# Patient Record
Sex: MALE | HISPANIC OR LATINO | URBAN - METROPOLITAN AREA
[De-identification: names, ages, dates, MRNs, and addresses within clinical notes are randomized per-mention and may not be internally consistent; named-entity substitution may affect disease eponyms.]

---

## 2019-05-14 PROBLEM — Z00.00 ENCOUNTER FOR PREVENTIVE HEALTH EXAMINATION: Status: ACTIVE | Noted: 2019-05-14

## 2021-06-11 ENCOUNTER — EMERGENCY (EMERGENCY)
Facility: HOSPITAL | Age: 66
LOS: 1 days | Discharge: HOME | End: 2021-06-11
Attending: EMERGENCY MEDICINE
Payer: COMMERCIAL

## 2021-06-11 VITALS
WEIGHT: 250 LBS | SYSTOLIC BLOOD PRESSURE: 152 MMHG | OXYGEN SATURATION: 98 % | HEART RATE: 77 BPM | RESPIRATION RATE: 18 BRPM | HEIGHT: 72 IN | TEMPERATURE: 98 F | DIASTOLIC BLOOD PRESSURE: 84 MMHG

## 2021-06-11 LAB
ALBUMIN SERPL ELPH-MCNC: 4.2 G/DL — SIGNIFICANT CHANGE UP (ref 3.3–5.2)
ALP SERPL-CCNC: 96 U/L — SIGNIFICANT CHANGE UP (ref 40–120)
ALT FLD-CCNC: 17 U/L — SIGNIFICANT CHANGE UP
ANION GAP SERPL CALC-SCNC: 12 MMOL/L — SIGNIFICANT CHANGE UP (ref 5–17)
AST SERPL-CCNC: 20 U/L — SIGNIFICANT CHANGE UP
BASOPHILS # BLD AUTO: 0.24 K/UL — HIGH (ref 0–0.2)
BASOPHILS NFR BLD AUTO: 2.4 % — HIGH (ref 0–2)
BILIRUB SERPL-MCNC: 0.4 MG/DL — SIGNIFICANT CHANGE UP (ref 0.4–2)
BUN SERPL-MCNC: 14 MG/DL — SIGNIFICANT CHANGE UP (ref 8–20)
CALCIUM SERPL-MCNC: 9.1 MG/DL — SIGNIFICANT CHANGE UP (ref 8.6–10.2)
CHLORIDE SERPL-SCNC: 107 MMOL/L — SIGNIFICANT CHANGE UP (ref 98–107)
CO2 SERPL-SCNC: 23 MMOL/L — SIGNIFICANT CHANGE UP (ref 22–29)
CREAT SERPL-MCNC: 0.84 MG/DL — SIGNIFICANT CHANGE UP (ref 0.5–1.3)
EOSINOPHIL # BLD AUTO: 0.48 K/UL — SIGNIFICANT CHANGE UP (ref 0–0.5)
EOSINOPHIL NFR BLD AUTO: 4.9 % — SIGNIFICANT CHANGE UP (ref 0–6)
GLUCOSE SERPL-MCNC: 95 MG/DL — SIGNIFICANT CHANGE UP (ref 70–99)
HCT VFR BLD CALC: 41.4 % — SIGNIFICANT CHANGE UP (ref 39–50)
HGB BLD-MCNC: 13.7 G/DL — SIGNIFICANT CHANGE UP (ref 13–17)
IMM GRANULOCYTES NFR BLD AUTO: 0.3 % — SIGNIFICANT CHANGE UP (ref 0–1.5)
LACTATE BLDV-MCNC: 1.1 MMOL/L — SIGNIFICANT CHANGE UP (ref 0.5–2)
LYMPHOCYTES # BLD AUTO: 1.44 K/UL — SIGNIFICANT CHANGE UP (ref 1–3.3)
LYMPHOCYTES # BLD AUTO: 14.6 % — SIGNIFICANT CHANGE UP (ref 13–44)
MCHC RBC-ENTMCNC: 28.4 PG — SIGNIFICANT CHANGE UP (ref 27–34)
MCHC RBC-ENTMCNC: 33.1 GM/DL — SIGNIFICANT CHANGE UP (ref 32–36)
MCV RBC AUTO: 85.9 FL — SIGNIFICANT CHANGE UP (ref 80–100)
MONOCYTES # BLD AUTO: 0.72 K/UL — SIGNIFICANT CHANGE UP (ref 0–0.9)
MONOCYTES NFR BLD AUTO: 7.3 % — SIGNIFICANT CHANGE UP (ref 2–14)
NEUTROPHILS # BLD AUTO: 6.95 K/UL — SIGNIFICANT CHANGE UP (ref 1.8–7.4)
NEUTROPHILS NFR BLD AUTO: 70.5 % — SIGNIFICANT CHANGE UP (ref 43–77)
PLATELET # BLD AUTO: 178 K/UL — SIGNIFICANT CHANGE UP (ref 150–400)
POTASSIUM SERPL-MCNC: 4.3 MMOL/L — SIGNIFICANT CHANGE UP (ref 3.5–5.3)
POTASSIUM SERPL-SCNC: 4.3 MMOL/L — SIGNIFICANT CHANGE UP (ref 3.5–5.3)
PROT SERPL-MCNC: 7.5 G/DL — SIGNIFICANT CHANGE UP (ref 6.6–8.7)
RBC # BLD: 4.82 M/UL — SIGNIFICANT CHANGE UP (ref 4.2–5.8)
RBC # FLD: 13.5 % — SIGNIFICANT CHANGE UP (ref 10.3–14.5)
SODIUM SERPL-SCNC: 142 MMOL/L — SIGNIFICANT CHANGE UP (ref 135–145)
WBC # BLD: 9.86 K/UL — SIGNIFICANT CHANGE UP (ref 3.8–10.5)
WBC # FLD AUTO: 9.86 K/UL — SIGNIFICANT CHANGE UP (ref 3.8–10.5)

## 2021-06-11 PROCEDURE — 73140 X-RAY EXAM OF FINGER(S): CPT | Mod: 26,LT

## 2021-06-11 PROCEDURE — 99218: CPT

## 2021-06-11 RX ORDER — PIPERACILLIN AND TAZOBACTAM 4; .5 G/20ML; G/20ML
3.38 INJECTION, POWDER, LYOPHILIZED, FOR SOLUTION INTRAVENOUS EVERY 8 HOURS
Refills: 0 | Status: DISCONTINUED | OUTPATIENT
Start: 2021-06-12 | End: 2021-06-16

## 2021-06-11 RX ORDER — IBUPROFEN 200 MG
600 TABLET ORAL EVERY 6 HOURS
Refills: 0 | Status: DISCONTINUED | OUTPATIENT
Start: 2021-06-11 | End: 2021-06-16

## 2021-06-11 RX ORDER — PIPERACILLIN AND TAZOBACTAM 4; .5 G/20ML; G/20ML
3.38 INJECTION, POWDER, LYOPHILIZED, FOR SOLUTION INTRAVENOUS ONCE
Refills: 0 | Status: COMPLETED | OUTPATIENT
Start: 2021-06-11 | End: 2021-06-11

## 2021-06-11 RX ORDER — TETANUS TOXOID, REDUCED DIPHTHERIA TOXOID AND ACELLULAR PERTUSSIS VACCINE, ADSORBED 5; 2.5; 8; 8; 2.5 [IU]/.5ML; [IU]/.5ML; UG/.5ML; UG/.5ML; UG/.5ML
0.5 SUSPENSION INTRAMUSCULAR ONCE
Refills: 0 | Status: COMPLETED | OUTPATIENT
Start: 2021-06-11 | End: 2021-06-11

## 2021-06-11 RX ORDER — ACETAMINOPHEN 500 MG
650 TABLET ORAL ONCE
Refills: 0 | Status: COMPLETED | OUTPATIENT
Start: 2021-06-11 | End: 2021-06-11

## 2021-06-11 RX ORDER — ACETAMINOPHEN 500 MG
650 TABLET ORAL EVERY 6 HOURS
Refills: 0 | Status: DISCONTINUED | OUTPATIENT
Start: 2021-06-11 | End: 2021-06-16

## 2021-06-11 RX ADMIN — TETANUS TOXOID, REDUCED DIPHTHERIA TOXOID AND ACELLULAR PERTUSSIS VACCINE, ADSORBED 0.5 MILLILITER(S): 5; 2.5; 8; 8; 2.5 SUSPENSION INTRAMUSCULAR at 17:54

## 2021-06-11 RX ADMIN — PIPERACILLIN AND TAZOBACTAM 200 GRAM(S): 4; .5 INJECTION, POWDER, LYOPHILIZED, FOR SOLUTION INTRAVENOUS at 18:43

## 2021-06-11 RX ADMIN — Medication 650 MILLIGRAM(S): at 18:24

## 2021-06-11 RX ADMIN — Medication 650 MILLIGRAM(S): at 17:54

## 2021-06-11 RX ADMIN — Medication 600 MILLIGRAM(S): at 18:24

## 2021-06-11 RX ADMIN — PIPERACILLIN AND TAZOBACTAM 3.38 GRAM(S): 4; .5 INJECTION, POWDER, LYOPHILIZED, FOR SOLUTION INTRAVENOUS at 19:20

## 2021-06-11 RX ADMIN — Medication 600 MILLIGRAM(S): at 17:54

## 2021-06-11 NOTE — ED PROVIDER NOTE - PHYSICAL EXAMINATION
Gen: Well appearing in NAD  Head: NC/AT  Neck: trachea midline  Resp:  No distress  left 4th digit diffuse swelling + distal lateral and pulp fluctuance + erythema extending to the anterior distal wrist. compartments soft. able to flex and extend at the wrist. + flexion and extension of the mcp dip, difficulty flexing at pip. nttp over flexor tendon.   Neuro:  A&O appears non focal  Skin:  Warm and dry as visualized  Psych:  Normal affect and mood

## 2021-06-11 NOTE — CONSULT NOTE ADULT - ATTENDING COMMENTS
Patient seen and examined at bedside with MARINA Couch with diagnosis of left finger abscess and hand cellulitis.  Agree with plan and I and D abscess with packing completed at bedside of the left hand 4th digit.  Patient to remain inpatient for IV antibiotics and advancement of packing over next 24 hours.  Please call with any issues.

## 2021-06-11 NOTE — ED PROVIDER NOTE - ATTENDING CONTRIBUTION TO CARE
65yoM; with no PMH(has not seen a physician for over 5 years); now p/w left 4th digit swelling and pain. states it began swelling 3-4 days ago. states he attempted to open lesion with needle 2 days ago. states he took amoxicillin which he bought from [employer], but the swelling has only increased. denies f/c/s. denies n/v. denies numbness/tingling.  EXAM:  General:     NAD, well-nourished, well-appearing  Head:     NC/AT, EOMI,   Neck:     trachea midline  Lungs:     CTA b/l, no w/r/r  CVS:     S1S2, RRR, no m/g/r  L HAND:  3cm area of erythema and fluctuance over ulnar aspect of dorsum of 4th digit. ttp over pulp of fingerpad with induration.  erythema tracking to dorsum of hand. pain with flexion @ PIP of 4th digit.    A/P:  65yoM p/w left 4th digit cellulitis with pulp infection  -hand c/s for I+D  -iv abx in obs

## 2021-06-11 NOTE — ED CDU PROVIDER INITIAL DAY NOTE - MEDICAL DECISION MAKING DETAILS
64 yo male with left 4th digit abscess drained by HAND started on iv abx 2/2  streaking up to the wrist. pt afebrile nontoxic appearing. started on zosyn Q8 will hold overnight and re-eval in am

## 2021-06-11 NOTE — ED PROVIDER NOTE - CARE PLAN
Principal Discharge DX:	Abscess of finger   Principal Discharge DX:	Abscess of finger  Secondary Diagnosis:	Cellulitis and abscess

## 2021-06-11 NOTE — ED PROVIDER NOTE - CLINICAL SUMMARY MEDICAL DECISION MAKING FREE TEXT BOX
66 yo male no reported health issues, presenting with left 4th finger infection with fluctuance and erythema streaking to distal anterior wrist. vitals stable afebrile nontoxic appearing. hand consulted. labs imaging and iv abx and re-eval

## 2021-06-11 NOTE — CONSULT NOTE ADULT - SUBJECTIVE AND OBJECTIVE BOX
Pt Name: BIJU FREY    MRN: [id number]      Patient is a 65y Male, seen with  and Dr. Chaparro, presenting to the emergency department with a chief complaint of left 4th digit abscess/cellulitis x 5 days. Patient denies trauma, animal bite, or splinter. Patient states he took one dose of Amoxicillin that he got from the [facility]. Patient denies fever/chills. No other complaints. Of note, patient states he attempted to kallie his finger himself yesterday, but only blood came out.        REVIEW OF SYSTEMS      General:	denies fever/chills    Respiratory and Thorax: denies SOB  	  Cardiovascular:	denies CP	    Musculoskeletal:	see HPI    Neurological:	 denies numbness/tingling      ROS is otherwise negative.    PAST MEDICAL & SURGICAL HISTORY:  PAST MEDICAL & SURGICAL HISTORY:      Allergies: penicillin (Urticaria)      Medications: ibuprofen  Tablet. 600 milliGRAM(s) Oral every 6 hours PRN  piperacillin/tazobactam IVPB. 3.375 Gram(s) IV Intermittent Once  piperacillin/tazobactam IVPB.. 3.375 Gram(s) IV Intermittent every 8 hours      FAMILY HISTORY:  : non-contributory    Social History:     Ambulation: Walking independently [X ] With Cane [ ] With Walker [ ]  Bedbound [ ]               PHYSICAL EXAM:    Vital Signs Last 24 Hrs  T(C): 36.8 (11 Jun 2021 16:28), Max: 36.8 (11 Jun 2021 16:28)  T(F): 98.2 (11 Jun 2021 16:28), Max: 98.2 (11 Jun 2021 16:28)  HR: 77 (11 Jun 2021 16:28) (77 - 77)  BP: 152/84 (11 Jun 2021 16:28) (152/84 - 152/84)  BP(mean): --  RR: 18 (11 Jun 2021 16:28) (18 - 18)  SpO2: 98% (11 Jun 2021 16:28) (98% - 98%)  Daily Height in cm: 182.88 (11 Jun 2021 16:28)    Daily     Appearance: Alert, responsive, in no acute distress.    Neurological: Sensation is grossly intact to light touch.     Skin: see 'LUE' exam    Vascular: 2+ distal pulses. Cap refill < 2 sec. No signs of venous insuffiency or stasis. No extremity ulcerations. No cyanosis.    Musculoskeletal:         Left Upper Extremity: + localized erythema and swelling to left 4th distal aspect of digit. + scab noted to lateral side of 4th digit (likely from patient's kallie attempt). + surrounding fluctuance. + erythema extending up dorsal aspect of finger. no erythema to wrist or forearm. + ROM of PIP joint and wrist. limited ROM of DIP joint secondary to pain. SILT. ext warm cap refill brisk.       Right Upper Extremity: can move freely without pain       Left Lower Extremity: can move freely without pain       Right Lower Extremity: can move freely without pain    Imaging Studies:  pending xray    A/P:  Pt is a  65y Male with left 4th digit abscess/cellulitis    PLAN:   D/W Dr. Chaparro  bedside I&D performed, packed with iodoform packing - see procedure note  f/u cultures  ortho to re eval tomorrow  IV abx

## 2021-06-11 NOTE — ED PROVIDER NOTE - OBJECTIVE STATEMENT
66 yo male no reported health issues does not see a doctor regularly presenting to ER with left 4th digit swelling pain and redness. reports taking amoxicillin [place] [employer], and attempting to drain the area by himself with pin needle that he boiled in water. unsure when last tetanus was. no reported fever or chills. has not taken anything for pain at home. no reported issues with blood sugar. pain over the left 4th digit. denies accidents or injuries. no reported other health issues. no hx of prior infections in the finger.   no smoking no drinking

## 2021-06-11 NOTE — ED CDU PROVIDER INITIAL DAY NOTE - ATTENDING CONTRIBUTION TO CARE
65yoM; with no PMH(has not seen a physician for over 5 years); now p/w left 4th digit swelling and pain. states it began swelling 3-4 days ago. states he attempted to open lesion with needle 2 days ago. states he took amoxicillin which he bought from [employer], but the swelling has only increased. denies f/c/s. denies n/v. denies numbness/tingling.  EXAM:  General:     NAD, well-nourished, well-appearing  Head:     NC/AT, EOMI,   Neck:     trachea midline  Lungs:     CTA b/l, no w/r/r  CVS:     S1S2, RRR, no m/g/r  L HAND:  3cm area of erythema and fluctuance over ulnar aspect of dorsum of 4th digit. ttp over pulp of fingerpad with induration.  erythema tracking to dorsum of hand. pain with flexion @ PIP of 4th digit.    A/P:  65yoM p/w left 4th digit cellulitis with pulp infection  -hand c/s for I+D  -iv abx in obs.

## 2021-06-11 NOTE — ED ADULT NURSE NOTE - OBJECTIVE STATEMENT
66y/o male with abscess to left 4th digit. finger red, warm to touch. pt denies any fever or chills, states he has taken azithromycin from a [employer]. pt aox4, resp even unlabored, denies fever or chills, +ROM to left hand, difficulty making a fist. will continue to monitor

## 2021-06-12 PROCEDURE — 99226: CPT

## 2021-06-12 RX ADMIN — PIPERACILLIN AND TAZOBACTAM 25 GRAM(S): 4; .5 INJECTION, POWDER, LYOPHILIZED, FOR SOLUTION INTRAVENOUS at 21:07

## 2021-06-12 RX ADMIN — Medication 650 MILLIGRAM(S): at 11:25

## 2021-06-12 RX ADMIN — PIPERACILLIN AND TAZOBACTAM 25 GRAM(S): 4; .5 INJECTION, POWDER, LYOPHILIZED, FOR SOLUTION INTRAVENOUS at 02:02

## 2021-06-12 RX ADMIN — PIPERACILLIN AND TAZOBACTAM 25 GRAM(S): 4; .5 INJECTION, POWDER, LYOPHILIZED, FOR SOLUTION INTRAVENOUS at 11:24

## 2021-06-12 RX ADMIN — PIPERACILLIN AND TAZOBACTAM 3.38 GRAM(S): 4; .5 INJECTION, POWDER, LYOPHILIZED, FOR SOLUTION INTRAVENOUS at 15:30

## 2021-06-12 RX ADMIN — Medication 650 MILLIGRAM(S): at 12:15

## 2021-06-12 RX ADMIN — PIPERACILLIN AND TAZOBACTAM 3.38 GRAM(S): 4; .5 INJECTION, POWDER, LYOPHILIZED, FOR SOLUTION INTRAVENOUS at 06:00

## 2021-06-12 NOTE — PROGRESS NOTE ADULT - ATTENDING COMMENTS
Patient seen and examined at bedside and agree with PA note and plan as written.  Patient to remain inpatient with continued IV antibiotics and may need a repeat I and D of necrotic tissue.  Ortho to follow up in am and will reevaluate.

## 2021-06-13 VITALS
SYSTOLIC BLOOD PRESSURE: 136 MMHG | TEMPERATURE: 98 F | OXYGEN SATURATION: 96 % | DIASTOLIC BLOOD PRESSURE: 73 MMHG | RESPIRATION RATE: 18 BRPM | HEART RATE: 67 BPM

## 2021-06-13 LAB — SARS-COV-2 RNA SPEC QL NAA+PROBE: SIGNIFICANT CHANGE UP

## 2021-06-13 PROCEDURE — 99217: CPT

## 2021-06-13 RX ORDER — CEPHALEXIN 500 MG
1 CAPSULE ORAL
Qty: 28 | Refills: 0
Start: 2021-06-13 | End: 2021-06-19

## 2021-06-13 RX ORDER — IBUPROFEN 200 MG
1 TABLET ORAL
Qty: 20 | Refills: 0
Start: 2021-06-13 | End: 2021-06-17

## 2021-06-13 RX ADMIN — PIPERACILLIN AND TAZOBACTAM 25 GRAM(S): 4; .5 INJECTION, POWDER, LYOPHILIZED, FOR SOLUTION INTRAVENOUS at 04:05

## 2021-06-13 RX ADMIN — Medication 600 MILLIGRAM(S): at 07:28

## 2021-06-13 RX ADMIN — PIPERACILLIN AND TAZOBACTAM 25 GRAM(S): 4; .5 INJECTION, POWDER, LYOPHILIZED, FOR SOLUTION INTRAVENOUS at 11:20

## 2021-06-13 RX ADMIN — Medication 600 MILLIGRAM(S): at 08:38

## 2021-06-13 RX ADMIN — PIPERACILLIN AND TAZOBACTAM 3.38 GRAM(S): 4; .5 INJECTION, POWDER, LYOPHILIZED, FOR SOLUTION INTRAVENOUS at 08:39

## 2021-06-13 RX ADMIN — PIPERACILLIN AND TAZOBACTAM 3.38 GRAM(S): 4; .5 INJECTION, POWDER, LYOPHILIZED, FOR SOLUTION INTRAVENOUS at 14:48

## 2021-06-13 NOTE — ED CDU PROVIDER DISPOSITION NOTE - CARE PROVIDER_API CALL
Bola Terry (DO)  Orthopaedic Surgery  [address] [place] [address]  Phone: [phone]  Fax: [phone]  Follow Up Time:

## 2021-06-13 NOTE — PROGRESS NOTE ADULT - SUBJECTIVE AND OBJECTIVE BOX
Pt Name: BIJU FREY  MRN: [id number]      Patient is a 65y male being followed for left 4th digit abscess s/p beside I&D on 6/11. Patient seen and examined, resting comfortably. Patient states that he feels that symptoms have improved since having the I&D and yesterdays examination. Able to flex and extend digits. Patient states that the swelling prevents him from being able to move his distal part of finger. Patient denies fever/chills, numbness, tingling.        PAST MEDICAL & SURGICAL HISTORY:  No pertinent past medical history    Allergies: penicillin (Urticaria)    Vital Signs Last 24 Hrs  T(C): 36.8 (13 Jun 2021 11:36), Max: 37.2 (12 Jun 2021 17:13)  T(F): 98.2 (13 Jun 2021 11:36), Max: 98.9 (12 Jun 2021 17:13)  HR: 67 (13 Jun 2021 11:36) (67 - 73)  BP: 136/73 (13 Jun 2021 11:36) (116/73 - 149/78)  BP(mean): --  RR: 18 (13 Jun 2021 11:36) (18 - 20)  SpO2: 96% (13 Jun 2021 11:36) (96% - 98%)                          13.7   9.86  )-----------( 178      ( 11 Jun 2021 18:21 )             41.4   06-11    142  |  107  |  14.0  ----------------------------<  95  4.3   |  23.0  |  0.84    Ca    9.1      11 Jun 2021 18:21    TPro  7.5  /  Alb  4.2  /  TBili  0.4  /  DBili  x   /  AST  20  /  ALT  17  /  AlkPhos  96  06-11      Appearance: Alert, responsive, in no acute distress.  Musculoskeletal:       Left Upper Extremity: + dressing to 4th digit dry, intact. Removed for exam. + localized swelling to distal left 4th distal aspect of digit. + incisional wound to left distal 4th digit s/p I&D. Iodoform packing removed. No active bleeding or drainage noted.  No erythema to wrist or forearm. + ROM of PIP joint, MCP joint and wrist. Limited ROM of DIP joint secondary to swelling/pain. SILT. ext warm cap refill brisk. Wound repacked with iodoform and new dressing applied. Patient reporting significant improvement since yesterdays dressing change.       Imagining studies:   < from: Xray Finger, Left Hand (06.11.21 @ 18:58) >     EXAM:  XR FINGER(S) MIN 2 VIEWS LT                          PROCEDURE DATE:  06/11/2021      INTERPRETATION:  AP, lateral, and oblique views of the LEFT fourth digit are submitted.    Clinical data; trauma with pain.    There is distal soft tissue laceration and swelling without underlying fracture or radiopaque foreign body..    IMPRESSION: Distal soft tissue swelling. No fracture.    ESTELLA CANO MD; Attending Radiologist  This document has been electronically signed. Jun 11 2021  7:06PM    < end of copied text >    A/P:  Pt is a  65y Male with  s/p left 4th digit beside I&D on 6/11    PLAN:   - Continue to monitor   - xrays of left hand negative   - f/u cultures   - Continue IV antibx 
Pt Name: BIJU FREY  MRN: [id number]      Patient is a 65y male being followed for left 4th digit abscess s/p beside I&D on 6/11. Patient seen and examined, resting comfortably. Patient states that he feels that symptoms have improved since having the I&D. Patient states that prior he has constant extreme pain to left 4th digit that pulsated but now states that there is only pain with palpation. Patient states that the swelling prevents him from being able to move his distal part of finger. Patient denies fever/chills, numbness, tingling.        PAST MEDICAL & SURGICAL HISTORY:  No pertinent past medical history        Allergies: penicillin (Urticaria)                   13.7   9.86  )-----------( 178      ( 11 Jun 2021 18:21 )             41.4     06-11    142  |  107  |  14.0  ----------------------------<  95  4.3   |  23.0  |  0.84    Ca    9.1      11 Jun 2021 18:21    TPro  7.5  /  Alb  4.2  /  TBili  0.4  /  DBili  x   /  AST  20  /  ALT  17  /  AlkPhos  96  06-11      PHYSICAL EXAM:    Vital Signs Last 24 Hrs  T(C): 36.6 (12 Jun 2021 08:24), Max: 37 (12 Jun 2021 05:03)  T(F): 97.9 (12 Jun 2021 08:24), Max: 98.6 (12 Jun 2021 05:03)  HR: 70 (12 Jun 2021 08:24) (67 - 77)  BP: 123/73 (12 Jun 2021 08:24) (116/75 - 152/84)  BP(mean): --  RR: 18 (12 Jun 2021 08:24) (18 - 18)  SpO2: 98% (12 Jun 2021 08:24) (97% - 98%)  Daily Height in cm: 182.88 (11 Jun 2021 16:28)    Daily     Appearance: Alert, responsive, in no acute distress.  Musculoskeletal:       Left Upper Extremity: + dressing to 4th digit dry, intact. Removed for exam. + localized circumferential erythema and swelling to distal left 4th distal aspect of digit. + incisional wound to left distal 4th digit s/p I&D. Iodoform packing removed. No active bleeding or drainage noted.  No erythema to wrist or forearm. + ROM of PIP joint, MCP joint and wrist. Limited ROM of DIP joint secondary to swelling/pain. SILT. ext warm cap refill brisk. Wound repacked with iodoform and new dressing applied.       Imagining studies:   < from: Xray Finger, Left Hand (06.11.21 @ 18:58) >     EXAM:  XR FINGER(S) MIN 2 VIEWS LT                          PROCEDURE DATE:  06/11/2021          INTERPRETATION:  AP, lateral, and oblique views of the LEFT fourth digit are submitted.    Clinical data; trauma with pain.    There is distal soft tissue laceration and swelling without underlying fracture or radiopaque foreign body..    IMPRESSION: Distal soft tissue swelling. No fracture.        ESTELLA CANO MD; Attending Radiologist  This document has been electronically signed. Jun 11 2021  7:06PM    < end of copied text >      A/P:  Pt is a  65y Male with  s/p left 4th digit beside I&D on 6/11    PLAN:   - Continue to monitor   - xrays of left hand negative   - f/u cultures   - Continue IV antibx

## 2021-06-13 NOTE — ED CDU PROVIDER DISPOSITION NOTE - CLINICAL COURSE
This is a  66 yo male no reported health issues does not see a doctor regularly presenting to ER with left 4th digit swelling pain and redness. reports taking amoxicillin [place] [employer], and attempting to drain the area by himself with pin needle that he boiled in water. unsure when last tetanus was. no reported fever or chills. has not taken anything for pain at home. no reported issues with blood sugar. pain over the left 4th digit. denies accidents or injuries. no reported other health issues. no hx of prior infections in the finger. denies any smoking and drinking.  Patient while in ED seen by orthopedics and had I&D done on affected finger and was give IV Zosyn.  Had no reaction to Zosyn, although allergy to PCN is urticaria.  Will RX Bactrim and Keflex and advised to follow up with orthopedic on Thursday 6/17.  Wound culture pending.  Patient feels much better.  Patient given copies of all results, understands and agrees to proceed.

## 2021-06-13 NOTE — ED CDU PROVIDER DISPOSITION NOTE - PATIENT PORTAL LINK FT
You can access the FollowMyHealth Patient Portal offered by [facility] by registering at the following website: [email or web address]. By joining [facility]’s FollowMyHealth portal, you will also be able to view your health information using other applications (apps) compatible with our system.

## 2021-06-13 NOTE — ED CDU PROVIDER SUBSEQUENT DAY NOTE - ATTENDING CONTRIBUTION TO CARE
seen on rounds with acp  pain improved  awaiting ortho consult and recommendations re antibiotic therapy, specifically continue IV or d/c on po, as well as f/u if discharged
seen with acp  s/p drainage of abscess to finger  on iv antibiotics  as epr ortho continue iv AB and fu cultures  agree with plan

## 2021-06-13 NOTE — ED CDU PROVIDER SUBSEQUENT DAY NOTE - HISTORY
Pt resting comfortably at time of re-assessment. No events overnight. Pending ortho re-assessment AM. Will continue to monitor.
No acute events overnight. Pt pending ortho following wound culture.

## 2021-06-13 NOTE — ED CDU PROVIDER DISPOSITION NOTE - NS_CDULENGTHOFSTAY_ED_A_ED
43 Hour(s) 24 Minute(s) Xolair Counseling:  Patient informed of potential adverse effects including but not limited to fever, muscle aches, rash and allergic reactions.  The patient verbalized understanding of the proper use and possible adverse effects of Xolair.  All of the patient's questions and concerns were addressed.

## 2021-06-13 NOTE — ED CDU PROVIDER SUBSEQUENT DAY NOTE - PHYSICAL EXAMINATION
Gen: Well appearing in NAD  Head: NC/AT  Neck: trachea midline  Resp:  No distress  MSK: left 4th digit diffuse swelling, Area wrapped in dressing FROM of L 4th digit  Neuro:  A&O appears non focal  Skin:  Warm and dry as visualized  Psych:  Normal affect and mood
Gen: Well appearing in NAD  Head: NC/AT  Neck: trachea midline  Resp:  No distress  MSK: left 4th digit diffuse swelling, + erythema extending to the anterior distal wrist. compartments soft. able to flex and extend at the wrist. + flexion and extension of the mcp dip, difficulty flexing at pip. nttp over flexor tendon. Area wrapped in dressing  Neuro:  A&O appears non focal  Skin:  Warm and dry as visualized  Psych:  Normal affect and mood

## 2021-06-13 NOTE — ED CDU PROVIDER DISPOSITION NOTE - NSFOLLOWUPINSTRUCTIONS_ED_ALL_ED_FT
Please take medications as prescribed    Return if symptoms worsen or persist    Follow up with orthopedic on Thursday    Leave dressing as is until follows up in office Thurday    Rest, and elevation

## 2021-06-13 NOTE — ED ADULT NURSE REASSESSMENT NOTE - NS ED NURSE REASSESS COMMENT FT1
Pt alert and oriented x 4, no c/o of pain or discomfort at this time. IV antibiotics for L index finger cellulitis. Safety precautions in place. Will continue to monitor
Pt awake and alert, no respiratory distress, VSS afebrile. Left hand swelling noted.   Patient in understanding of plan of care. Patient with no further questions for the RN. Resting in comfort. Call bell within reach and encouraged to use when assistance needed. Will continue to monitor.
Pt resting comfortably in bed. No acute distress noted. Breathing equal and unlabored. Call bell within reach. Safety maintained.
Received report form day shift; pt awake and alert- skin warm and dry. Left index finger with dressing in place.  Pt offers no complaints at this time. IV intact; VSS.
Pt alert and oriented x 4, no c/o of pain or discomfort at this time. Left 4th digit wrapped. IV antibiotics infusing. Safety precautions in place. Will continue to monitor
Assumed care of the patient @3358. Pt A&Ox4. Left hand 4th finger swelling noted. Patient in understanding of plan of care. Patient with no further questions for the RN. Resting in comfort. Call bell within reach and encouraged to use when assistance needed. Will continue to monitor.
Assumed care of patient at approx 1930. No acute distress noted. A&OX4. Pt denies c/p, sob, dizziness and headache. Breathing equal and unlabored. Resting comfortably in bed. POC reviewed with patient. Patent #18 L hand ; Dressing CDI.  IVF infusing per md orders. Call bell within reach; call bell education use provided. Will continue to monitor.
assumed care of pt @ 6775, report received from PG RN. pt AOx4 in NAD. dressing in place on left fourth digit, clean dry intact. respirations even and unlabored. denies chest discomfort. abd soft and nondistended. skin WNL for race. pt c/o 4/10 pain from left fourth digit, pain medication given according to pain scale. all questions and concerns addressed. call bell within reach, safety maintained.

## 2021-06-13 NOTE — ED CDU PROVIDER SUBSEQUENT DAY NOTE - MEDICAL DECISION MAKING DETAILS
Pt in obs receiving IV abx and awaiting hand follow up.
zosyn Q8 will hold overnight and ortho to re-eval in am

## 2021-06-13 NOTE — ED CDU PROVIDER SUBSEQUENT DAY NOTE - NS ED ROS FT
Gen: denies fever, chills, fatigue, weight loss  Skin: denies rashes, laceration, bruising  HEENT: denies visual changes, ear pain, nasal congestion, throat pain  Respiratory: denies HOGAN, SOB, cough, wheezing  Cardiovascular: denies chest pain, palpitations, diaphoresis, LE edema  GI: denies abdominal pain, n/v/d  : denies dysuria, frequency, urgency, bowel/bladder incontinence  MSK: +Left 4th finger redness/swelling. denies joint pain, back pain, neck pain  Neuro: denies headache, dizziness, weakness, numbness  Psych: denies anxiety, depression, SI/HI, visual/auditory hallucinations
Gen: denies fever, chills, fatigue, weight loss  Skin: denies rashes, laceration, bruising  HEENT: denies visual changes, ear pain, nasal congestion, throat pain  Respiratory: denies HOGAN, SOB, cough, wheezing  Cardiovascular: denies chest pain, palpitations, diaphoresis, LE edema  GI: denies abdominal pain, n/v/d  : denies dysuria, frequency, urgency, bowel/bladder incontinence  MSK: +Left 4th finger redness/swelling. denies joint pain, back pain, neck pain  Neuro: denies headache, dizziness, weakness, numbness  Psych: denies anxiety, depression, SI/HI, visual/auditory hallucinations

## 2021-06-15 LAB
-  AMPICILLIN/SULBACTAM: SIGNIFICANT CHANGE UP
-  CEFAZOLIN: SIGNIFICANT CHANGE UP
-  CLINDAMYCIN: SIGNIFICANT CHANGE UP
-  ERYTHROMYCIN: SIGNIFICANT CHANGE UP
-  GENTAMICIN: SIGNIFICANT CHANGE UP
-  OXACILLIN: SIGNIFICANT CHANGE UP
-  PENICILLIN: SIGNIFICANT CHANGE UP
-  RIFAMPIN: SIGNIFICANT CHANGE UP
-  TETRACYCLINE: SIGNIFICANT CHANGE UP
-  TRIMETHOPRIM/SULFAMETHOXAZOLE: SIGNIFICANT CHANGE UP
-  VANCOMYCIN: SIGNIFICANT CHANGE UP
CULTURE RESULTS: SIGNIFICANT CHANGE UP
METHOD TYPE: SIGNIFICANT CHANGE UP
ORGANISM # SPEC MICROSCOPIC CNT: SIGNIFICANT CHANGE UP
ORGANISM # SPEC MICROSCOPIC CNT: SIGNIFICANT CHANGE UP
SPECIMEN SOURCE: SIGNIFICANT CHANGE UP

## 2021-06-16 LAB
CULTURE RESULTS: SIGNIFICANT CHANGE UP
CULTURE RESULTS: SIGNIFICANT CHANGE UP
SPECIMEN SOURCE: SIGNIFICANT CHANGE UP
SPECIMEN SOURCE: SIGNIFICANT CHANGE UP

## 2025-06-18 ENCOUNTER — APPOINTMENT (OUTPATIENT)
Dept: GASTROENTEROLOGY | Facility: CLINIC | Age: 70
End: 2025-06-18
Payer: COMMERCIAL

## 2025-06-18 VITALS
BODY MASS INDEX: 38.6 KG/M2 | HEIGHT: 72 IN | OXYGEN SATURATION: 95 % | HEART RATE: 45 BPM | SYSTOLIC BLOOD PRESSURE: 140 MMHG | WEIGHT: 285 LBS | DIASTOLIC BLOOD PRESSURE: 70 MMHG

## 2025-06-18 PROCEDURE — G2211 COMPLEX E/M VISIT ADD ON: CPT | Mod: NC

## 2025-06-18 PROCEDURE — 99203 OFFICE O/P NEW LOW 30 MIN: CPT

## 2025-06-18 RX ORDER — POLYETHYLENE GLYCOL 3350, SODIUM SULFATE, POTASSIUM CHLORIDE, MAGNESIUM SULFATE, AND SODIUM CHLORIDE FOR ORAL SOLUTION 178.7-7.3G
178.7 KIT ORAL
Qty: 1 | Refills: 0 | Status: ACTIVE | COMMUNITY
Start: 2025-06-18 | End: 1900-01-01

## 2025-06-18 RX ORDER — PSYLLIUM HUSK 0.4 G
CAPSULE ORAL
Refills: 0 | Status: ACTIVE | COMMUNITY

## 2025-07-07 ENCOUNTER — APPOINTMENT (OUTPATIENT)
Dept: CARDIOLOGY | Facility: CLINIC | Age: 70
End: 2025-07-07